# Patient Record
Sex: MALE | Race: WHITE | NOT HISPANIC OR LATINO | Employment: OTHER | URBAN - METROPOLITAN AREA
[De-identification: names, ages, dates, MRNs, and addresses within clinical notes are randomized per-mention and may not be internally consistent; named-entity substitution may affect disease eponyms.]

---

## 2017-04-10 ENCOUNTER — TELEPHONE (OUTPATIENT)
Dept: OPTOMETRY | Facility: CLINIC | Age: 52
End: 2017-04-10

## 2017-07-11 ENCOUNTER — OFFICE VISIT (OUTPATIENT)
Dept: OPTOMETRY | Facility: CLINIC | Age: 52
End: 2017-07-11

## 2017-07-11 DIAGNOSIS — H53.141 PHOTOPHOBIA OF RIGHT EYE: ICD-10-CM

## 2017-07-11 DIAGNOSIS — H57.04 TRAUMATIC MYDRIASIS: ICD-10-CM

## 2017-07-11 DIAGNOSIS — H53.8 BLURRED VISION: ICD-10-CM

## 2017-07-11 DIAGNOSIS — H27.01 APHAKIA, RIGHT: Primary | ICD-10-CM

## 2017-07-11 DIAGNOSIS — H53.9 UNSPECIFIED VISUAL DISTURBANCE: ICD-10-CM

## 2017-07-11 ASSESSMENT — CUP TO DISC RATIO: OD_RATIO: 0.10

## 2017-07-11 ASSESSMENT — TONOMETRY
IOP_METHOD: APPLANATION
OD_IOP_MMHG: 16
OS_IOP_MMHG: 17

## 2017-07-11 ASSESSMENT — REFRACTION_CURRENTRX
OD_BASECURVE: 8.3
OD_DIAMETER: 14.5
OD_SPHERE: +11.00

## 2017-07-11 ASSESSMENT — REFRACTION_MANIFEST
OD_AXIS: 110
OD_SPHERE: +10.75
OD_CYLINDER: +0.50

## 2017-07-11 ASSESSMENT — SLIT LAMP EXAM - LIDS
COMMENTS: NORMAL
COMMENTS: NORMAL

## 2017-07-11 ASSESSMENT — VISUAL ACUITY
OS_CC: 20/20
METHOD: SNELLEN - LINEAR
OD_CC: 20/50+2
CORRECTION_TYPE: CONTACTS

## 2017-07-11 NOTE — MR AVS SNAPSHOT
After Visit Summary   2017    Dread Miranda    MRN: [id number]           Patient Information     Date Of Birth          1965        Visit Information        Provider Department      2017 2:30 PM Teodora Mansfield, [facility]        Today's Diagnoses     Aphakia, right - Right Eye    -  1    Blurred vision        Unspecified visual disturbance        Traumatic mydriasis        Photophobia of right eye           Follow-ups after your visit        Who to contact     Please call your clinic at [phone] to:    Ask questions about your health    Make or cancel appointments    Discuss your medicines    Learn about your test results    Speak to your doctor   If you have compliments or concerns about an experience at your clinic, or if you wish to file a complaint, please contact [facility] Patient Relations at [phone] or email us at [email or web address]         Additional Information About Your Visit        MyChart Information     [employer] is an electronic gateway that provides easy, online access to your medical records. With [employer], you can request a clinic appointment, read your test results, renew a prescription or communicate with your care team.     To sign up for [employer] visit the website at [email or web address]   You will be asked to enter the access code listed below, as well as some personal information. Please follow the directions to create your username and password.     Your access code is: [email or web address]  Expires: 10/10/2017  3:13 PM     Your access code will  in 90 days. If you need help or a new code, please contact your [facility] or call [phone] for assistance.        Care EveryWhere ID     This is your Care EveryWhere ID. This could be used by other organizations to access your [place] medical records  [id number]         Blood Pressure from Last 3 Encounters:   12  121/78   04/22/12 129/73    Weight from Last 3 Encounters:   04/22/12 87.1 kg (192 lb)              We Performed the Following     OCT Retina Spectralis OD (right eye)        Primary Care Provider Office Phone # Fax #    Cullen Majano [phone]       [facility] [address]  [facility] [address]        Equal Access to Services     [facility]: Hadii aad ku hadasho Soomaali, waaxda luqadaha, qaybta kaalmada adeegyada, waxay idiin hayaan adeeg kharash la'aan ah. So [facility] [phone].    ATENCIÓN: Si stevensonla jaydon, tiene a lemons disposición servicios gratuitos de asistencia lingüística. Llame al [phone].    We comply with applicable federal civil rights laws and [place] laws. We do not discriminate on the basis of race, color, national origin, age, disability sex, sexual orientation or gender identity.            Thank you!     Thank you for choosing [facility]  for your care. Our goal is always to provide you with excellent care. Hearing back from our patients is one way we can continue to improve our services. Please take a few minutes to complete the written survey that you may receive in the mail after your visit with us. Thank you!             Your Updated Medication List - Protect others around you: Learn how to safely use, store and throw away your medicines at www.disposemymeds.org.          This list is accurate as of: 7/11/17 11:59 PM.  Always use your most recent med list.                   Brand Name Dispense Instructions for use Diagnosis    ALLEGRA PO

## 2017-07-12 NOTE — PROGRESS NOTES
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 Rec 1 year comprehensive exam.    Contact Lens Billing  V-Code:  - CL, other  Final Contact Lens Rx      Brand Base Curve Diameter Sphere Lens   Right Mau Tinted Annular 8.3 14.5 +11.50 [place] #4, 4mm pupil clear   Left                 # of units: 1  Price per Unit: $100    This patient requires contact lenses that are medically necessary for either improvement in vision over spectacles, support of the ocular surface, or other therapeutic benefit. These are not cosmetic contact lenses.  Reduction in light sensitivity    Encounter Diagnoses   Name Primary?     Blurred vision      Unspecified visual disturbance      Aphakia, right - Right Eye Yes     Traumatic mydriasis      Photophobia of right eye

## 2018-08-14 ENCOUNTER — OFFICE VISIT (OUTPATIENT)
Dept: OPTOMETRY | Facility: CLINIC | Age: 53
End: 2018-08-14

## 2018-08-14 DIAGNOSIS — H53.141 PHOTOPHOBIA OF RIGHT EYE: ICD-10-CM

## 2018-08-14 DIAGNOSIS — H27.01 APHAKIA, RIGHT: Primary | ICD-10-CM

## 2018-08-14 DIAGNOSIS — H57.04 TRAUMATIC MYDRIASIS: ICD-10-CM

## 2018-08-14 NOTE — MR AVS SNAPSHOT
After Visit Summary   8/14/2018    Dread Miranda    MRN: [id number]           Patient Information     Date Of Birth          1965        Visit Information        Provider Department      8/14/2018 8:00 AM Teodora Mansfield, [facility]        Today's Diagnoses     Aphakia, right - Right Eye    -  1    Traumatic mydriasis        Photophobia of right eye           Follow-ups after your visit        Who to contact     Please call your clinic at [phone] to:    Ask questions about your health    Make or cancel appointments    Discuss your medicines    Learn about your test results    Speak to your doctor            Additional Information About Your Visit        Care EveryWhere ID     This is your Care EveryWhere ID. This could be used by other organizations to access your [facility] medical records  [id number]         Blood Pressure from Last 3 Encounters:   05/04/12 121/78   04/22/12 129/73    Weight from Last 3 Encounters:   04/22/12 87.1 kg (192 lb)              Today, you had the following     No orders found for display       Primary Care Provider Office Phone # Fax #    Cullen Majano [phone]       [facility] [address]  [facility] [address]        Equal Access to Services     [facility]: Hadii aad ku hadasho Soomaali, waaxda luqadaha, qaybta kaalmada adeegyada, melody malave hayrowan oliveira . So [facility] [phone].    ATENCIÓN: Si habla español, tiene a lemons disposición servicios gratuitos de asistencia lingüística. [facility] [phone].    We comply with applicable federal civil rights laws and [place] laws. We do not discriminate on the basis of race, color, national origin, age, disability, sex, sexual orientation, or gender identity.            Thank you!     Thank you for choosing [facility]  for your care. Our goal is always to provide you with excellent care. Hearing back from our patients is one way we can continue to  improve our services. Please take a few minutes to complete the written survey that you may receive in the mail after your visit with us. Thank you!             Your Updated Medication List - Protect others around you: Learn how to safely use, store and throw away your medicines at www.disposemymeds.org.          This list is accurate as of 8/14/18  9:22 AM.  Always use your most recent med list.                   Brand Name Dispense Instructions for use Diagnosis    ALLEGRA PO

## 2018-08-14 NOTE — PROGRESS NOTES
No office visit. CL order only.    Contact Lens Billing  V-Code:  - CL, other  Final Contact Lens Rx      Brand Base Curve Diameter Sphere Lens   Right Mau Tinted Annular 8.3 14.5 +11.50 [place] #4, 4mm pupil clear   Left                 # of units: 1  Price per Unit: $100    This patient requires contact lenses that are medically necessary for either improvement in vision over spectacles, support of the ocular surface, or other therapeutic benefit. These are not cosmetic contact lenses.  Reduction in light sensitivity    Encounter Diagnoses   Name Primary?     Aphakia, right - Right Eye Yes     Traumatic mydriasis      Photophobia of right eye

## 2019-01-28 ENCOUNTER — OFFICE VISIT (OUTPATIENT)
Dept: OPTOMETRY | Facility: CLINIC | Age: 54
End: 2019-01-28
Payer: COMMERCIAL

## 2019-01-28 DIAGNOSIS — H53.141 PHOTOPHOBIA OF RIGHT EYE: ICD-10-CM

## 2019-01-28 DIAGNOSIS — H27.01 APHAKIA, RIGHT: Primary | ICD-10-CM

## 2019-01-28 DIAGNOSIS — H57.04 TRAUMATIC MYDRIASIS: ICD-10-CM

## 2019-01-28 ASSESSMENT — REFRACTION_CURRENTRX
OD_SPHERE: +11.50
OD_BRAND: ALDEN TINTED ANNULAR
OD_BASECURVE: 8.3
OD_DIAMETER: 14.5

## 2020-06-23 ENCOUNTER — OFFICE VISIT (OUTPATIENT)
Dept: OPTOMETRY | Facility: CLINIC | Age: 55
End: 2020-06-23

## 2020-06-23 DIAGNOSIS — H27.01 APHAKIA, RIGHT: Primary | ICD-10-CM

## 2020-06-23 DIAGNOSIS — H53.141 PHOTOPHOBIA OF RIGHT EYE: ICD-10-CM

## 2020-06-23 DIAGNOSIS — H57.04 TRAUMATIC MYDRIASIS: ICD-10-CM

## 2020-06-23 ASSESSMENT — REFRACTION_CURRENTRX
OD_BRAND: ALDEN TINTED ANNULAR
OD_DIAMETER: 14.5
OD_BASECURVE: 8.3
OD_SPHERE: +11.50

## 2020-06-23 NOTE — PROGRESS NOTES
No office visit. CL order only. Spoke with patient. He would like 1 duplicate tinted lens and then same lens but clear for driving at night.     Informed him he is overdue for an eye exam ([facility] 07/2017). We will allow this order due to covid but told him he needs an updated exam prior to any future ordering.     Contact Lens Billing  V-Code:  - CL, other  Final Contact Lens Rx       Brand Base Curve Diameter Sphere Lens    Right Mau Tinted Annular 8.3 14.5 +11.50 [place] #4, 4mm pupil clear    Left           Final Contact Lens Rx #2       Brand Base Curve Diameter Sphere Lens    Right Mau HP Sphere 8.3 14.5 +11.50 Clear    Left              # of units: 1 clear lens @ $90 per lens, 1 tinted lens @ $125 per lens    This patient requires contact lenses that are medically necessary for either improvement in vision over spectacles, support of the ocular surface, or other therapeutic benefit. These are not cosmetic contact lenses.  Reduction in light sensitivity    Encounter Diagnoses   Name Primary?     Aphakia, right - Right Eye Yes     Traumatic mydriasis      Photophobia of right eye         Date of last eye exam: 7/11/17

## 2022-12-05 ENCOUNTER — OFFICE VISIT (OUTPATIENT)
Dept: OPTOMETRY | Facility: CLINIC | Age: 57
End: 2022-12-05
Payer: COMMERCIAL

## 2022-12-05 DIAGNOSIS — H57.04: ICD-10-CM

## 2022-12-05 DIAGNOSIS — H27.01 APHAKIA OF RIGHT EYE: Primary | ICD-10-CM

## 2022-12-05 DIAGNOSIS — H52.32 ANISOMETROPIA AND ANISEIKONIA: ICD-10-CM

## 2022-12-05 DIAGNOSIS — H52.31 ANISOMETROPIA AND ANISEIKONIA: ICD-10-CM

## 2022-12-05 ASSESSMENT — REFRACTION_CURRENTRX
OD_DIAMETER: 14.5
OD_BRAND: BIOFINITY
OS_SPHERE: -2.50
OD_SPHERE: +11.50
OD_BASECURVE: 8.3
OD_BRAND: ALDEN TINTED ANNULAR
OD_SPHERE: +11.50
OD_BASECURVE: 8.3
OS_CYLINDER: -0.75
OS_AXIS: 070
OS_BRAND: ACUVUE OASYS
OS_SPHERE: -2.50
OD_SPHERE: +11.50
OD_DIAMETER: 14.5

## 2022-12-05 ASSESSMENT — CONF VISUAL FIELD
OS_INFERIOR_TEMPORAL_RESTRICTION: 0
OD_INFERIOR_TEMPORAL_RESTRICTION: 0
OS_NORMAL: 1
OS_SUPERIOR_NASAL_RESTRICTION: 0
OD_INFERIOR_NASAL_RESTRICTION: 0
OS_INFERIOR_NASAL_RESTRICTION: 0
OD_NORMAL: 1
OD_SUPERIOR_TEMPORAL_RESTRICTION: 0
OS_SUPERIOR_TEMPORAL_RESTRICTION: 0
OD_SUPERIOR_NASAL_RESTRICTION: 0

## 2022-12-05 ASSESSMENT — REFRACTION_MANIFEST
OS_SPHERE: -2.75
OD_AXIS: 060
OS_AXIS: 160
OD_SPHERE: +6.25
OD_CYLINDER: +1.75
OS_CYLINDER: +0.50

## 2022-12-05 ASSESSMENT — EXTERNAL EXAM - LEFT EYE: OS_EXAM: NORMAL

## 2022-12-05 ASSESSMENT — VISUAL ACUITY
METHOD: SNELLEN - LINEAR
OS_CC: 20/20-3
CORRECTION_TYPE: CONTACTS
OD_CC: 20/40-2

## 2022-12-05 ASSESSMENT — SLIT LAMP EXAM - LIDS
COMMENTS: NORMAL
COMMENTS: NORMAL

## 2022-12-05 ASSESSMENT — REFRACTION_WEARINGRX
OD_CYLINDER: +0.50
OD_AXIS: 110
OD_SPHERE: +10.75

## 2022-12-05 ASSESSMENT — TONOMETRY
IOP_METHOD: ICARE
OD_IOP_MMHG: 12
OS_IOP_MMHG: 13

## 2022-12-05 ASSESSMENT — EXTERNAL EXAM - RIGHT EYE: OD_EXAM: NORMAL

## 2022-12-05 ASSESSMENT — CUP TO DISC RATIO
OS_RATIO: 0.10
OD_RATIO: 0.10

## 2022-12-05 NOTE — PROGRESS NOTES
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 Lens very old, needs replacing. Appreciates small toric OR that agrees with jovanna cyl today. BCVA 20/25- with this  -Would like option for clear lens prn - Biofinity XR in spherical would be best    2.) Myopia left eye  -Wearing AV Oasys lens, doing well    3.) Lattice degeneration OU  -Reviewed signs/symptoms of RD and need for stat eye eval should they occur    Order 1 tinted lens and mail direct to pt. Can call/[facility] to order more. Rec 1 year dilated eye exam, sooner prn with new concerns.       Contact Lens Billing  V-Code:  - CL, other  Final Contact Lens Rx       Brand Base Curve Diameter Sphere Cylinder Axis Lens    Right Biofinity XR 8.6 14.0 +11.50       Left Acuvue Oasys Astigmatism 8.6 14.5 -2.50 -0.75 070     Expiration Date: 12/5/24    Replacement: Two weeks    Solutions: Multipurpose    Wearing Schedule: Daily Wear      Final Contact Lens Rx #2       Brand Base Curve Diameter Sphere Cylinder Axis Lens    Right [place] Tinted Annular 8.3 14.5 +13.50 -2.00 155 [place] #4, 4mm pupil clear    Left                # of units: 1 tinted right lens  Price per Unit: $145    This patient requires contact lenses that are medically necessary for either improvement in vision over spectacles, support of the ocular surface, or other therapeutic benefit. These are not cosmetic contact lenses.  Reduction in light sensitivity    Encounter Diagnoses   Name Primary?     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

## 2023-06-28 ENCOUNTER — TELEPHONE (OUTPATIENT)
Dept: OPTOMETRY | Facility: CLINIC | Age: 58
End: 2023-06-28

## 2023-06-29 ENCOUNTER — OFFICE VISIT (OUTPATIENT)
Dept: OPTOMETRY | Facility: CLINIC | Age: 58
End: 2023-06-29
Payer: COMMERCIAL

## 2023-06-29 ENCOUNTER — TELEPHONE (OUTPATIENT)
Dept: OPTOMETRY | Facility: CLINIC | Age: 58
End: 2023-06-29

## 2023-06-29 DIAGNOSIS — H52.32 ANISOMETROPIA AND ANISEIKONIA: ICD-10-CM

## 2023-06-29 DIAGNOSIS — H57.04: ICD-10-CM

## 2023-06-29 DIAGNOSIS — H52.31 ANISOMETROPIA AND ANISEIKONIA: ICD-10-CM

## 2023-06-29 DIAGNOSIS — H27.01 APHAKIA OF RIGHT EYE: Primary | ICD-10-CM

## 2023-06-29 ASSESSMENT — REFRACTION_CURRENTRX
OD_SPHERE: +13.50
OS_DIAMETER: 14.5
OD_SPHERE: +11.50
OD_BRAND: BIOFINITY XR
OD_DIAMETER: 14.0
OD_BASECURVE: 8.3
OS_AXIS: 070
OS_BRAND: ACUVUE OASYS ASTIGMATISM
OD_AXIS: 155
OS_SPHERE: -2.50
OS_BASECURVE: 8.6
OD_DIAMETER: 14.5
OD_BASECURVE: 8.6
OS_CYLINDER: -0.75
OD_BRAND: ALDEN TINTED ANNULAR
OD_CYLINDER: -2.00

## 2023-06-29 NOTE — PROGRESS NOTES
No office visit. CL order only.      Contact Lens Billing  V-Code:  - CL, other  Final Contact Lens Rx       Brand Base Curve Diameter Sphere Cylinder Axis Lens    Right Biofinity XR 8.6 14.0 +11.50       Left Acuvue Oasys Astigmatism 8.6 14.5 -2.50 -0.75 070       Final Contact Lens Rx #2       Brand Base Curve Diameter Sphere Cylinder Axis Lens    Right Mau Tinted Annular 8.3 14.5 +13.50 -2.00 155 [place] #4, 4mm pupil clear    Left                # of units:  1 box clear Rx lens (Rx1) right eye @ $60 per box + $7.95 shipping = $67.95 ([facility] order mailed direct [id number])  1 right tinted lens (Rx2) @ $145 per lens (Mau order mailed direct [id number])    = $212.95 total    This patient requires contact lenses that are medically necessary for either improvement in vision over spectacles, support of the ocular surface, or other therapeutic benefit. These are not cosmetic contact lenses.  Reduction in light sensitivity    Encounter Diagnoses   Name Primary?     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

## 2025-07-08 ENCOUNTER — TELEPHONE (OUTPATIENT)
Dept: OPTOMETRY | Facility: CLINIC | Age: 60
End: 2025-07-08

## 2025-07-08 NOTE — TELEPHONE ENCOUNTER
Spoke with patient regarding request for Mau tinted contact lens for right eye.   Pt rx is  as of 2024 and he will need to have an exam to have this updated.     Pt understands. [place] He is going to see if there is an eye doctor locally that can fit the lens for an update.     He was told to call the main scheduling line if he would like to schedule with Dr Mansfield at any point.     NANCY Fitzpatrick on 2025 at 11:59 AM